# Patient Record
Sex: FEMALE | Race: WHITE | Employment: FULL TIME | URBAN - METROPOLITAN AREA
[De-identification: names, ages, dates, MRNs, and addresses within clinical notes are randomized per-mention and may not be internally consistent; named-entity substitution may affect disease eponyms.]

---

## 2017-03-07 PROCEDURE — 87624 HPV HI-RISK TYP POOLED RSLT: CPT | Performed by: OBSTETRICS & GYNECOLOGY

## 2017-03-07 PROCEDURE — 87491 CHLMYD TRACH DNA AMP PROBE: CPT | Performed by: OBSTETRICS & GYNECOLOGY

## 2017-03-07 PROCEDURE — 87480 CANDIDA DNA DIR PROBE: CPT | Performed by: OBSTETRICS & GYNECOLOGY

## 2017-03-07 PROCEDURE — 87660 TRICHOMONAS VAGIN DIR PROBE: CPT | Performed by: OBSTETRICS & GYNECOLOGY

## 2017-03-07 PROCEDURE — 87625 HPV TYPES 16 & 18 ONLY: CPT | Performed by: OBSTETRICS & GYNECOLOGY

## 2017-03-07 PROCEDURE — 88175 CYTOPATH C/V AUTO FLUID REDO: CPT | Performed by: OBSTETRICS & GYNECOLOGY

## 2017-03-07 PROCEDURE — 87591 N.GONORRHOEAE DNA AMP PROB: CPT | Performed by: OBSTETRICS & GYNECOLOGY

## 2017-03-07 PROCEDURE — 87510 GARDNER VAG DNA DIR PROBE: CPT | Performed by: OBSTETRICS & GYNECOLOGY

## 2017-03-23 PROCEDURE — 88305 TISSUE EXAM BY PATHOLOGIST: CPT | Performed by: OBSTETRICS & GYNECOLOGY

## 2018-05-16 PROCEDURE — 87660 TRICHOMONAS VAGIN DIR PROBE: CPT | Performed by: FAMILY MEDICINE

## 2018-05-16 PROCEDURE — 88175 CYTOPATH C/V AUTO FLUID REDO: CPT | Performed by: FAMILY MEDICINE

## 2018-05-16 PROCEDURE — 87510 GARDNER VAG DNA DIR PROBE: CPT | Performed by: FAMILY MEDICINE

## 2018-05-16 PROCEDURE — 87624 HPV HI-RISK TYP POOLED RSLT: CPT | Performed by: FAMILY MEDICINE

## 2018-05-16 PROCEDURE — 87480 CANDIDA DNA DIR PROBE: CPT | Performed by: FAMILY MEDICINE

## 2018-05-16 PROCEDURE — 87625 HPV TYPES 16 & 18 ONLY: CPT | Performed by: FAMILY MEDICINE

## 2018-06-17 ENCOUNTER — HOSPITAL ENCOUNTER (OUTPATIENT)
Age: 38
Discharge: HOME | End: 2018-06-17
Attending: FAMILY MEDICINE
Payer: COMMERCIAL

## 2018-06-17 VITALS
SYSTOLIC BLOOD PRESSURE: 124 MMHG | RESPIRATION RATE: 16 BRPM | HEIGHT: 64 IN | WEIGHT: 160 LBS | OXYGEN SATURATION: 98 % | BODY MASS INDEX: 27.31 KG/M2 | HEART RATE: 90 BPM | DIASTOLIC BLOOD PRESSURE: 80 MMHG | TEMPERATURE: 98 F

## 2018-06-17 DIAGNOSIS — B02.9 HERPES ZOSTER WITHOUT COMPLICATION: Primary | ICD-10-CM

## 2018-06-17 PROCEDURE — 99203 OFFICE O/P NEW LOW 30 MIN: CPT

## 2018-06-17 PROCEDURE — 99204 OFFICE O/P NEW MOD 45 MIN: CPT

## 2018-06-17 RX ORDER — ACYCLOVIR 800 MG/1
800 TABLET ORAL 4 TIMES DAILY
Qty: 28 TABLET | Refills: 0 | Status: SHIPPED | OUTPATIENT
Start: 2018-06-17 | End: 2018-06-24

## 2018-06-17 NOTE — ED PROVIDER NOTES
Patient Seen in: 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 Cardiovascular: Normal rate, regular rhythm, normal heart sounds and intact distal pulses. Pulmonary/Chest: Effort normal and breath sounds normal.   Abdominal: Soft.  Bowel sounds are normal.   Neurologica

## 2018-06-17 NOTE — ED INITIAL ASSESSMENT (HPI)
The patient is here with complaints of a rash to the right shoulder blade that has been very itchy, burning, and painful. She states it has been present for about 1 week.   At first she thought it was a bug bit and has used hydrocortizone cream but not tod

## 2018-06-18 PROCEDURE — 88342 IMHCHEM/IMCYTCHM 1ST ANTB: CPT | Performed by: OBSTETRICS & GYNECOLOGY

## 2018-06-18 PROCEDURE — 88305 TISSUE EXAM BY PATHOLOGIST: CPT | Performed by: OBSTETRICS & GYNECOLOGY

## 2019-07-11 PROCEDURE — 87625 HPV TYPES 16 & 18 ONLY: CPT | Performed by: OBSTETRICS & GYNECOLOGY

## 2019-07-11 PROCEDURE — 88175 CYTOPATH C/V AUTO FLUID REDO: CPT | Performed by: OBSTETRICS & GYNECOLOGY

## 2019-07-11 PROCEDURE — 87624 HPV HI-RISK TYP POOLED RSLT: CPT | Performed by: OBSTETRICS & GYNECOLOGY

## 2019-09-30 PROCEDURE — 88305 TISSUE EXAM BY PATHOLOGIST: CPT | Performed by: OBSTETRICS & GYNECOLOGY

## 2019-09-30 PROCEDURE — 88342 IMHCHEM/IMCYTCHM 1ST ANTB: CPT | Performed by: OBSTETRICS & GYNECOLOGY

## 2021-02-12 PROBLEM — F41.1 GENERALIZED ANXIETY DISORDER: Status: ACTIVE | Noted: 2021-02-12

## 2021-02-12 PROBLEM — F39 EPISODIC MOOD DISORDER: Status: ACTIVE | Noted: 2021-02-12

## 2021-02-12 PROBLEM — F39 EPISODIC MOOD DISORDER (HCC): Status: ACTIVE | Noted: 2021-02-12

## 2021-02-12 PROBLEM — F33.1 MODERATE RECURRENT MAJOR DEPRESSION (HCC): Status: ACTIVE | Noted: 2021-02-12

## 2021-04-15 PROBLEM — F90.2 ATTENTION DEFICIT HYPERACTIVITY DISORDER (ADHD), COMBINED TYPE: Status: ACTIVE | Noted: 2021-04-15

## 2021-08-20 PROCEDURE — 88305 TISSUE EXAM BY PATHOLOGIST: CPT | Performed by: INTERNAL MEDICINE

## 2024-02-03 ENCOUNTER — WALK IN (OUTPATIENT)
Dept: URGENT CARE | Age: 44
End: 2024-02-03

## 2024-02-03 VITALS
DIASTOLIC BLOOD PRESSURE: 79 MMHG | OXYGEN SATURATION: 97 % | TEMPERATURE: 96.3 F | HEART RATE: 85 BPM | RESPIRATION RATE: 18 BRPM | SYSTOLIC BLOOD PRESSURE: 147 MMHG

## 2024-02-03 DIAGNOSIS — M43.6 TORTICOLLIS, ACUTE: Primary | ICD-10-CM

## 2024-02-03 RX ORDER — LAMOTRIGINE 150 MG/1
TABLET ORAL
COMMUNITY
Start: 2023-12-14

## 2024-02-03 RX ORDER — ALPRAZOLAM 0.5 MG/1
TABLET ORAL
COMMUNITY
Start: 2023-12-14

## 2024-02-03 RX ORDER — KETOROLAC TROMETHAMINE 10 MG/1
10 TABLET, FILM COATED ORAL EVERY 6 HOURS PRN
Qty: 20 TABLET | Refills: 0 | Status: SHIPPED | OUTPATIENT
Start: 2024-02-03

## 2024-02-03 RX ORDER — TRAZODONE HYDROCHLORIDE 50 MG/1
TABLET ORAL
COMMUNITY
Start: 2023-12-14

## 2024-02-03 RX ORDER — METHYLPREDNISOLONE ACETATE 80 MG/ML
80 INJECTION, SUSPENSION INTRA-ARTICULAR; INTRALESIONAL; INTRAMUSCULAR; SOFT TISSUE ONCE
Status: COMPLETED | OUTPATIENT
Start: 2024-02-03 | End: 2024-02-03

## 2024-02-03 RX ORDER — BUPROPION HYDROCHLORIDE 300 MG/1
TABLET ORAL
COMMUNITY
Start: 2023-12-14

## 2024-02-03 RX ORDER — KETOROLAC TROMETHAMINE 30 MG/ML
15 INJECTION, SOLUTION INTRAMUSCULAR; INTRAVENOUS ONCE
Status: COMPLETED | OUTPATIENT
Start: 2024-02-03 | End: 2024-02-03

## 2024-02-03 RX ORDER — LISDEXAMFETAMINE DIMESYLATE CAPSULES 70 MG/1
CAPSULE ORAL
COMMUNITY
Start: 2023-12-14

## 2024-02-03 RX ORDER — PREDNISONE 20 MG/1
40 TABLET ORAL DAILY
Qty: 10 TABLET | Refills: 0 | Status: SHIPPED | OUTPATIENT
Start: 2024-02-03 | End: 2024-02-08

## 2024-02-03 RX ORDER — SUMATRIPTAN 6 MG/.5ML
INJECTION, SOLUTION SUBCUTANEOUS
COMMUNITY

## 2024-02-03 RX ADMIN — METHYLPREDNISOLONE ACETATE 80 MG: 80 INJECTION, SUSPENSION INTRA-ARTICULAR; INTRALESIONAL; INTRAMUSCULAR; SOFT TISSUE at 08:17

## 2024-02-03 RX ADMIN — KETOROLAC TROMETHAMINE 15 MG: 30 INJECTION, SOLUTION INTRAMUSCULAR; INTRAVENOUS at 08:20

## 2024-02-03 ASSESSMENT — PAIN SCALES - GENERAL
PAINLEVEL: 6
PAINLEVEL: 8

## 2025-02-17 ENCOUNTER — TELEPHONE (OUTPATIENT)
Dept: ORTHOPEDICS CLINIC | Facility: CLINIC | Age: 45
End: 2025-02-17

## 2025-02-17 DIAGNOSIS — M25.562 LEFT KNEE PAIN, UNSPECIFIED CHRONICITY: Primary | ICD-10-CM

## 2025-02-17 NOTE — TELEPHONE ENCOUNTER
Patient called to schedule for the left knee. Please advise if Marielena needs imaging   Future Appointments   Date Time Provider Department Center   2/18/2025  8:00 AM Marielena Sims PA-C [facility] [address]

## 2025-02-18 ENCOUNTER — OFFICE VISIT (OUTPATIENT)
Facility: CLINIC | Age: 45
End: 2025-02-18
Payer: COMMERCIAL

## 2025-02-18 ENCOUNTER — HOSPITAL ENCOUNTER (OUTPATIENT)
Dept: GENERAL RADIOLOGY | Age: 45
Discharge: HOME | End: 2025-02-18
Payer: COMMERCIAL

## 2025-02-18 VITALS — BODY MASS INDEX: 27.83 KG/M2 | HEIGHT: 64.5 IN | WEIGHT: 165 LBS

## 2025-02-18 DIAGNOSIS — M25.462 EFFUSION OF LEFT KNEE: Primary | ICD-10-CM

## 2025-02-18 DIAGNOSIS — M25.562 LEFT KNEE PAIN, UNSPECIFIED CHRONICITY: ICD-10-CM

## 2025-02-18 PROCEDURE — 73564 X-RAY EXAM KNEE 4 OR MORE: CPT

## 2025-02-18 PROCEDURE — 20610 DRAIN/INJ JOINT/BURSA W/O US: CPT

## 2025-02-18 PROCEDURE — 99204 OFFICE O/P NEW MOD 45 MIN: CPT

## 2025-02-18 RX ORDER — KETOROLAC TROMETHAMINE 10 MG/1
1 TABLET, FILM COATED ORAL EVERY 6 HOURS PRN
COMMUNITY
Start: 2024-02-03

## 2025-02-18 RX ORDER — TRIAMCINOLONE ACETONIDE 40 MG/ML
40 INJECTION, SUSPENSION INTRA-ARTICULAR; INTRAMUSCULAR ONCE
Status: COMPLETED | OUTPATIENT
Start: 2025-02-18 | End: 2025-02-18

## 2025-02-18 RX ADMIN — TRIAMCINOLONE ACETONIDE 40 MG: 40 INJECTION, SUSPENSION INTRA-ARTICULAR; INTRAMUSCULAR at 15:53:00

## 2025-02-18 NOTE — PROCEDURES
After informed consent, the patient's left knee was marked and prepped with antiseptic solution.  Local anesthetic was used to create a skin wheal near the superolateral aspiration site.  It was reprepped with antiseptic solution, and an 18-gauge needle was inserted through the superolateral portal site, into the knee joint deep to the patella.  Aspiration was performed and returned 35cc of serosanguineous fluid.   Following this, the patient's left knee was was injected with a mixture of 1mL 40mg/mL Kenalog, 2mL 1% lidocaine and 2mL 0.5% marcaine through the superolateral portal.  A band-aid was applied.  The patient tolerated the procedure well.   A band-aid was applied.  The patient tolerated the procedure well.    Marielena Sims PA-C  [facility]

## 2025-02-18 NOTE — PROGRESS NOTES
[facility] New Patient Note    CC: left knee swelling   Chief Complaint   Patient presents with    Knee Pain     Left Knee Pain  Onset: 1 month,  swollen  Pain Score: 8-9, pain upon exertion         HPI: This 44 year old female presents today with complaints of left knee swelling.  She reports onset of left knee pain and swelling started approximately 1-1/2 months ago and have been progressively worsening over the past week.  No known injury or trauma to the left knee or lower extremities.  States one morning she woke up and noticed that her left knee was very swollen.  This has continued for the past month and a half and does not seem to be getting any better.  The last time this occurred was in adolescence for which she had her knee aspirated a few times which seem to provide relief. Associated with the swelling, she reports a deep, achy, throbbing and constant pain and stiffness that is generalized to the left knee.  She has discomfort with her activities of daily living including walking for prolonged periods of time, standing for prolonged periods of time, going up and down stairs, sitting at rest, and sleeping at night.  Depending on how she walks, the knee does feel unstable at times.  She denies any numbness or tingling that travels down the leg or into the toes.  She has been taking over-the-counter anti-inflammatory medication and icing which has not provided much relief.  She is here today for further evaluation.    Past Medical History:    Celiac disease (HCC)    Headache     Past Surgical History:   Procedure Laterality Date    Colonoscopy            twins - 1 birth    Other surgical history      wisdom teeth     Current Outpatient Medications   Medication Sig Dispense Refill    Ketorolac Tromethamine 10 MG Oral Tab Take 1 tablet (10 mg total) by mouth every 6 (six) hours as needed.      SUMAtriptan 6 MG/0.5ML Subcutaneous Solution Inject 0.5 mL (6 mg total) into the skin as needed (migraine).  Max 2 injections in 24 hours 3 each 2    amphetamine-dextroamphetamine 20 MG Oral Tab TAKE ONE TABLET BY MOUTH (20 MG TOTAL) TWO TIMES DAILY 60 tablet 0    traZODone 50 MG Oral Tab Take one or two tabs at bedtime as needed, for insomnia 60 tablet 1    ALPRAZolam 0.5 MG Oral Tab TAKE 1/2 OR ONE TABLET BY MOUTH ONCE DAILY (OR LESS FREQUENT) AS NEEDED FOR ANXIETY 30 tablet 1    buPROPion 150 MG Oral Tablet 24 Hr Take 1 tablet (150 mg total) by mouth every morning. 30 tablet 3    lamoTRIgine 100 MG Oral Tab Take 1 tablet (100 mg total) by mouth daily. 30 tablet 3    levonorgestrel 19.5 MG Intrauterine IUD 19,500 mcg (1 each total) by Intrauterine route one time.      metroNIDAZOLE (METROGEL-VAGINAL) 0.75 % Vaginal Gel Place 1 applicator vaginally once a week. For 4-6 months (Patient not taking: Reported on 2/18/2025) 70 g 1     Allergies[1]  Family History   Problem Relation Age of Onset    Psychiatric Mother         anorexia    Other (Other) Maternal Grandmother         parkinsons    Heart Disorder Maternal Grandfather     Heart Disorder Paternal Grandfather     Diabetes Paternal Grandfather     Hypertension Paternal Grandfather     Cancer Paternal Aunt 50        colon    Breast Cancer Maternal Aunt 40        dx age 40     Social History     Occupational History    Not on file   Tobacco Use    Smoking status: Former     Current packs/day: 0.00     Types: Cigarettes     Quit date: 5/1/2009     Years since quitting: 15.8    Smokeless tobacco: Never   Vaping Use    Vaping status: Never Used   Substance and Sexual Activity    Alcohol use: Yes     Alcohol/week: 0.0 standard drinks of alcohol     Types: 2 - 3 Glasses of wine per week     Comment: social    Drug use: No    Sexual activity: Not on file        ROS:  Comprehensive system review obtained and negative except as mentioned above    Physical Exam:    Ht 5' 4.5\" (1.638 m)   Wt 165 lb (74.8 kg)   BMI 27.88 kg/m²   Constitutional: Awake, alert, no distress. Very  pleasant and conversational   Psychological: Appropriate affect.  Respiratory: Unlabored breathing.  Left lower extremity:  Inspection: skin is intact without any redness or deformity.  Moderate effusion.   Palpation: No warmth to palpation. Mild tenderness to palpation about the lateral joint line. No tenderness to palpation over the medial joint line.  No tenderness palpation over the superior or inferior pole the patella.  No tenderness palpation over the distal quad tendon.  No appreciable quad atrophy.  Range of motion: Knee can extend to 10 degrees short of full and flex to approximately 100 degrees.  Mild pain with active knee extension.  No pain with active flexion  Knee is stable to valgus and varus stress at 0 and 30 degrees. No laxity with anterior or posterior drawer.  Positive Katelyn test.  No calf pain or tenderness.  Negative Homans' sign.  Neuromuscular: Strength is normal and sensation is intact.  Vascular: Extremities are warm and well-perfused.  Lymph: Unremarkable.    Imaging: Imaging was personally viewed, independently interpreted and radiology report read. They show:  XR KNEE, COMPLETE (4 OR MORE VIEWS), LEFT (CPT=73564)    Result Date: 2/18/2025  CONCLUSION:  No evidence of acute displaced fracture or dislocation in the left knee.  Medium-sized joint effusion.  LOCATION:  [facility]   Dictated by (CST): Jose Manuel Kwok MD on 2/18/2025 at 8:19 AM     Finalized by (CST): Jose Manuel Kwok MD on 2/18/2025 at 8:19 AM         Assessment/Plan:  Assessment: 44-year-old female with symptomatic joint effusion of the left knee    Plan: I reviewed x-ray and physical exam findings with the patient which seems to be consistent with a symptomatic joint effusion of the left knee.  Radiographs of the left knee did not show any signs of acute fracture or dislocation, however there is a moderate joint effusion noted on x-ray and physical exam as well as positive Katelyn test.  Joint spaces are very well preserved  and there does not appear to be any significant signs of degenerative joint disease. Discussed differential diagnosis include meniscus tear, other internal derangement, inflammatory or rheumatoid arthritis, pseudogout vs gout, other autoimmune disease, flare up from weather changes, diet etc. Due to the moderate joint effusion and significant pain/impact this is having on her activities of daily living, it would be reasonable to perform an aspiration of the knee and diagnostic vs therapeutic corticosteroid injection.  Please see separate procedure note for additional details. MRI order was placed at today's visit to evaluate for structural cause or further internal derangement of the left knee. Patient will call to schedule and follow up with me in person after MRI to review results and discuss next steps in treatment.  She has an appointment scheduled in April with a rheumatologist as well.  All questions and concerns were addressed and answered to the patient satisfaction.  She is in agreement with treatment plan going forward.    DARCIE Cárdenas, PA-C  Orthopedic Surgery   t: [phone]  f: [phone]           This document was partially prepared using Dragon Medical voice recognition software.  Although every attempt is made to correct errors during dictation, discrepancies may still exist. Please contact me with any questions or clarifications.         [1]   Allergies  Allergen Reactions    Penicillins HIVES and SWELLING     Joint swelling    Gluten Flour

## 2025-03-26 ENCOUNTER — HOSPITAL ENCOUNTER (OUTPATIENT)
Dept: MRI IMAGING | Facility: HOSPITAL | Age: 45
Discharge: HOME | End: 2025-03-26
Payer: COMMERCIAL

## 2025-03-26 DIAGNOSIS — M25.462 EFFUSION OF LEFT KNEE: ICD-10-CM

## 2025-03-26 PROCEDURE — 73721 MRI JNT OF LWR EXTRE W/O DYE: CPT

## 2025-03-27 ENCOUNTER — TELEPHONE (OUTPATIENT)
Facility: CLINIC | Age: 45
End: 2025-03-27

## 2025-03-27 NOTE — TELEPHONE ENCOUNTER
Per Result Note:      Left voice mail for pt to call back and schedule MRI follow up with MAYE Cárdenas

## 2025-04-03 ENCOUNTER — TELEPHONE (OUTPATIENT)
Facility: CLINIC | Age: 45
End: 2025-04-03

## 2025-04-03 DIAGNOSIS — M25.561 PAIN IN BOTH KNEES, UNSPECIFIED CHRONICITY: Primary | ICD-10-CM

## 2025-04-03 DIAGNOSIS — M25.562 PAIN IN BOTH KNEES, UNSPECIFIED CHRONICITY: Primary | ICD-10-CM

## 2025-04-04 ENCOUNTER — OFFICE VISIT (OUTPATIENT)
Dept: ORTHOPEDICS CLINIC | Facility: CLINIC | Age: 45
End: 2025-04-04
Payer: COMMERCIAL

## 2025-04-04 VITALS — HEIGHT: 64.5 IN | WEIGHT: 172 LBS | BODY MASS INDEX: 29.01 KG/M2

## 2025-04-04 DIAGNOSIS — M25.562 LEFT KNEE PAIN, UNSPECIFIED CHRONICITY: ICD-10-CM

## 2025-04-04 DIAGNOSIS — M25.462 EFFUSION OF LEFT KNEE: Primary | ICD-10-CM

## 2025-04-04 PROCEDURE — 99213 OFFICE O/P EST LOW 20 MIN: CPT

## 2025-04-04 NOTE — PROGRESS NOTES
[facility] Progress Note    {Vanishing suggested script  \"DrAlvino [X] would like to utilize a tool that securely records the visit conversation to help write his/her medical notes so he/she can pay closer attention to you and less time on the computer\".:[id number]}     The following individual(s) verbally consented to be recorded using ambient AI listening technology and understand that they can each withdraw their consent to this listening technology at any point by asking the clinician to turn off or pause the recording:    Patient name: Clotilde Pritchard        Chief Complaint:  MRI follow up left knee    History of Present Illness        Physical Exam      Results  Imaging was personally viewed, independently interpreted and radiology report read.  They show:   MRI KNEE, LEFT (XMW=[id number])    Result Date: 3/26/2025  CONCLUSION:  Large joint effusion with synovitis throughout the knee.  No discrete evidence of internal derangement of the knee is otherwise noted.  Given the significant effusion and synovitis with lack of other findings, the appearance would suggest an inflammatory/rheumatologic process.   LOCATION:  [facility]          Dictated by (CST): Gino Schmidt DO on 3/26/2025 at 9:51 AM     Finalized by (CST): Gino Schmidt DO on 3/26/2025 at 9:55 AM         Assessment & Plan  Assessment:  44 year old female with ***      Plan:  ***      DARCIE Cárdenas, PA-C  Orthopedic Surgery   [address] [place] [id number]   t: [phone]  f: [phone]         This document was partially prepared using Dragon Medical voice recognition software.  Although every attempt is made to correct errors during dictation, discrepancies may still exist. Please contact me with any questions or clarifications.

## 2025-04-04 NOTE — PROGRESS NOTES
[facility] Progress Note      Chief Complaint: Follow-up left knee MRI      Subjective: 44 year old female presents today to follow-up on left knee MRI.  Over the past 2 months, she reports the left knee pain and swelling have slightly improved. She continues to experience intermittent left knee swelling, but it seems to be less than before. She cannot recall any specific incidents or changes in routine that seem to trigger the flare up. She describes it more as a discomfort rather than pain. This affects her activities of daily living including walking for long periods of time, standing for prolonged periods of time, going up and down stairs, sitting with the knees bent at rest, and occasionally sleeping at night.  She denies any new feelings of weakness or instability.  She denies any numbness or tingling that travels down the leg or into the toes.  She is here today for follow-up.      Objective: Very pleasant 44-year-old female who does not appear in any acute distress.  Awake, alert, and oriented to person, place, and time.  Unlabored breathing.  Appropriate affect.  Exam of the left knee and lower extremity reveals overlying skin is intact.  There is a moderate joint effusion present.  No erythema or ecchymosis.  Knee can extend to 10 degrees short of full and flex to approximately 100 degrees.  Mild pain with active knee extension.  No pain with active flexion.  No calf pain or tenderness.  Negative Homans' sign.  Sensation is present to light touch.  She ambulates with a non-antalgic gait.      Imaging: Imaging was personally viewed, independently interpreted, and radiology report read.  They show:  MRI KNEE, LEFT (ITT=[id number])    Result Date: 3/26/2025  CONCLUSION:  Large joint effusion with synovitis throughout the knee.  No discrete evidence of internal derangement of the knee is otherwise noted.  Given the significant effusion and synovitis with lack of other findings, the appearance would suggest an  inflammatory/rheumatologic process.   LOCATION:  [place]          Dictated by (CST): Gino Schmidt DO on 3/26/2025 at 9:51 AM     Finalized by (CST): Gino Schmidt DO on 3/26/2025 at 9:55 AM           Assessment:  44 year old female with effusion of the left knee and chronic left knee pain      Plan: Overall, the patient is mildly improved since her last office visit and has noticed some decrease in the amount and occurrence of the swelling in her left knee, although still present.  Reviewed results of the left knee MRI in great detail with the patient explained that there appears to be a large joint effusion with synovitis throughout the knee.  However there does not appear to be any concrete evidence of internal derangement to the knee. Explained the medial and lateral meniscus, ACL, PCL, MCL, and LCL all appear intact.  In agreement with the radiologist, given the significant effusion and synovitis with lack of other findings, the appearance would suggest an inflammatory or rheumatological process.  The patient states this is not a surprise and kind of what she expected.  She has a follow-up appointment next week with a rheumatologist and will discuss with them further for best next steps in treatment.  She will follow-up with me on an as-needed basis or sooner with any worsening symptoms or concerns.  All questions and concerns were addressed and answered to the patient satisfaction.  She is agreement with treatment plan going forward.      Marielena Sims [facility], PA-C  Orthopedic Surgery   [address] [place] [id number]   t: [phone]  f: [phone]         This document was partially prepared using Dragon Medical voice recognition software.  Although every attempt is made to correct errors during dictation, discrepancies may still exist. Please contact me with any questions or clarifications.

## 2025-05-12 ENCOUNTER — TELEPHONE (OUTPATIENT)
Dept: ORTHOPEDICS CLINIC | Facility: CLINIC | Age: 45
End: 2025-05-12

## 2025-05-12 DIAGNOSIS — M25.561 RIGHT KNEE PAIN, UNSPECIFIED CHRONICITY: Primary | ICD-10-CM

## 2025-05-16 ENCOUNTER — HOSPITAL ENCOUNTER (OUTPATIENT)
Dept: GENERAL RADIOLOGY | Age: 45
Discharge: HOME | End: 2025-05-16
Payer: COMMERCIAL

## 2025-05-16 ENCOUNTER — OFFICE VISIT (OUTPATIENT)
Dept: ORTHOPEDICS CLINIC | Facility: CLINIC | Age: 45
End: 2025-05-16
Payer: COMMERCIAL

## 2025-05-16 VITALS — HEIGHT: 64 IN | BODY MASS INDEX: 29.02 KG/M2 | WEIGHT: 170 LBS

## 2025-05-16 DIAGNOSIS — M25.461 EFFUSION OF RIGHT KNEE: Primary | ICD-10-CM

## 2025-05-16 DIAGNOSIS — M25.561 ACUTE PAIN OF RIGHT KNEE: ICD-10-CM

## 2025-05-16 DIAGNOSIS — M25.561 RIGHT KNEE PAIN, UNSPECIFIED CHRONICITY: ICD-10-CM

## 2025-05-16 PROCEDURE — 99214 OFFICE O/P EST MOD 30 MIN: CPT

## 2025-05-16 PROCEDURE — 73564 X-RAY EXAM KNEE 4 OR MORE: CPT

## 2025-05-16 NOTE — PROGRESS NOTES
The following individual(s) verbally consented to be recorded using ambient AI listening technology and understand that they can each withdraw their consent to this listening technology at any point by asking the clinician to turn off or pause the recording:    Patient name: Clotilde Pritchard  Additional names:

## 2025-05-16 NOTE — PROGRESS NOTES
[facility] New Patient Note         The following individual(s) verbally consented to be recorded using ambient AI listening technology and understand that they can each withdraw their consent to this listening technology at any point by asking the clinician to turn off or pause the recording:    Patient name: Clotilde Pritchard    Chief Complaint   Patient presents with    Knee Pain     Right knee pain starting about 1  month ago   Previously seen for her left knee   Patient notes a sharp pain ad instability . Notes discomfort with twisting certain ways . Notes the first three weeks have been worse notes a little better . States swelling is at the top of knee cap up        History of Present Illness  Allegra Pritchard is a 44 year old female who presents with right knee pain and instability.    About a month ago, she experienced a sharp pain in her right knee while kneeling to clean a dryer vent, accompanied by a 'pop' sound and immediate pain. Since then, the knee feels unstable when walking, and she avoids turning or bending it due to discomfort. The pain is sharp, with significant swelling, particularly after increased activity. She experiences tightness and stiffness sensation when trying to bend the knee. No pain at rest or during sleep. Denies any numbness or tingling sensation in the lower extremity.    The swelling is persistent, primarily located above the knee and extending into the thigh, without radiation down the leg or to the back. There is no catching or locking sensation, but the knee feels 'shaky' and 'floating'. She avoids stairs due to instability and uses a wedge under her knee and a footrest at her desk for comfort while resting.    She has a history of issues with her left knee but reports that prior to this incident, her right knee was asymptomatic. She has seen a rheumatologist and had an MRI of her neck due to soreness, and she is awaiting further evaluation  of her autoimmune and rheumatological conditions.  As of now, her rheumatologist believes that chronic left knee swelling and other symptoms she has been experiencing is consistent with psoriatic arthritis.     She has been taking ibuprofen and Tylenol for pain management which is providing some short-term relief.  She has been icing occasionally.  She is here today for further evaluation.       Past Medical History[1]  Past Surgical History[2]  Current Medications[3]  Allergies[4]  Family History[5]  Social History     Occupational History    Not on file   Tobacco Use    Smoking status: Former     Current packs/day: 0.00     Types: Cigarettes     Quit date: 2009     Years since quittin.0    Smokeless tobacco: Never    Tobacco comments:     Updated 25   Vaping Use    Vaping status: Never Used   Substance and Sexual Activity    Alcohol use: Yes     Alcohol/week: 0.0 standard drinks of alcohol     Types: 2 - 3 Glasses of wine per week     Comment: social    Drug use: No    Sexual activity: Not on file        ROS:  Comprehensive system review obtained and negative except as mentioned above    Physical Exam:      Physical Exam      Ht 5' 4\" (1.626 m)   Wt 170 lb (77.1 kg)   BMI 29.18 kg/m²   Constitutional: Awake, alert, no distress.  Very pleasant and conversational  Psychological: Appropriate affect.  Respiratory: Unlabored breathing.  Right lower extremity:  Inspection: skin is intact without any redness or deformity.  Moderate effusion.   Palpation: Mild tenderness to palpation over the lateral joint line.  No tenderness to palpation over the medial joint line.  No tenderness to palpation over the superior or inferior pole of the patella.  No tenderness to palpation over the distal quad tendon.  No appreciable quad atrophy.  Range of motion: Knee can extend to 0 and flex to approximately 120 degrees.  She has some stiffness with active knee flexion.  No pain with active extension or flexion.   Positive Marilee test.  No calf pain or tenderness.  Negative Homans' sign.  Neuromuscular: Strength is normal and sensation is intact.  Vascular: Extremities are warm and well-perfused.  Lymph: Unremarkable.    Imaging: Imaging was personally viewed, independently interpreted and radiology report read. They show:  XR KNEE, COMPLETE (4 OR MORE VIEWS), RIGHT (CPT=73564)  Result Date: 5/16/2025  CONCLUSION:   No fracture dislocation.  No significant arthropathy.  No sign of significant joint narrowing osteophyte endplate sclerosis or other features typical for arthropathy.  Minimal joint fluid, but no large joint effusion.  Mild anterior knee swelling.  LOCATION:  [facility]   Dictated by (CST): Bam Phillips MD on 5/16/2025 at 8:25 AM     Finalized by (SANDRA): Bam Phillips MD on 5/16/2025 at 8:25 AM          Results        Assessment & Plan  Assessment: 44-year-old female with acute right knee pain and right knee effusion    Plan: I reviewed x-ray and physical exam findings with the patient in great detail which is consistent with acute right knee effusion of unspecified significance.  I explained that radiographs today do not show any signs of an acute fracture or dislocation and joint spaces are very well-preserved without any significant signs of degenerative joint disease.  There does appear to be a suprapatellar joint effusion on imaging and physical exam today.  Given the patient's mechanism of injury, knee effusion, and positive Marilee test I would like to order an MRI to check for further internal derangement to the right knee which she is in agreement with.  An MRI order was placed at today's visit and she will call to schedule soon.  In the meantime, advised her to continue taking ibuprofen on an as needed basis to help with the pain and inflammation.  Continue icing and elevating the knee several times a day.  Encouraged her to continue gentle range of motion exercises about the knee to reduce stiffness.   She will follow-up with me after the MRI is completed in the office to discuss the results and best next steps in treatment.  She will follow-up sooner with any worsening symptoms or concerns.  All questions and concerns were addressed and answered to the patient's satisfaction.  She is in agreement with treatment plan going forward.      DARICE Cárdenas, MERLYN  Orthopedic Surgery   t: [phone]  f: [phone]           This document was partially prepared using Dragon Medical voice recognition software.  Although every attempt is made to correct errors during dictation, discrepancies may still exist. Please contact me with any questions or clarifications.         [1]   Past Medical History:   Celiac disease (HCC)    Headache   [2]   Past Surgical History:  Procedure Laterality Date    Colonoscopy            twins - 1 birth    Other surgical history      wisdom teeth   [3]   Current Outpatient Medications   Medication Sig Dispense Refill    Ketorolac Tromethamine 10 MG Oral Tab Take 1 tablet (10 mg total) by mouth every 6 (six) hours as needed.      SUMAtriptan 6 MG/0.5ML Subcutaneous Solution Inject 0.5 mL (6 mg total) into the skin as needed (migraine). Max 2 injections in 24 hours 3 each 2    amphetamine-dextroamphetamine 20 MG Oral Tab TAKE ONE TABLET BY MOUTH (20 MG TOTAL) TWO TIMES DAILY 60 tablet 0    traZODone 50 MG Oral Tab Take one or two tabs at bedtime as needed, for insomnia 60 tablet 1    metroNIDAZOLE (METROGEL-VAGINAL) 0.75 % Vaginal Gel Place 1 applicator vaginally once a week. For 4-6 months 70 g 1    ALPRAZolam 0.5 MG Oral Tab TAKE 1/2 OR ONE TABLET BY MOUTH ONCE DAILY (OR LESS FREQUENT) AS NEEDED FOR ANXIETY 30 tablet 1    buPROPion 150 MG Oral Tablet 24 Hr Take 1 tablet (150 mg total) by mouth every morning. 30 tablet 3    lamoTRIgine 100 MG Oral Tab Take 1 tablet (100 mg total) by mouth daily. 30 tablet 3    levonorgestrel 19.5 MG Intrauterine IUD 19,500 mcg (1 each total) by  Intrauterine route one time.     [4]   Allergies  Allergen Reactions    Penicillins HIVES and SWELLING     Joint swelling    Gluten Flour    [5]   Family History  Problem Relation Age of Onset    Psychiatric Mother         anorexia    Other (Other) Maternal Grandmother         parkinsons    Heart Disorder Maternal Grandfather     Heart Disorder Paternal Grandfather     Diabetes Paternal Grandfather     Hypertension Paternal Grandfather     Cancer Paternal Aunt 50        colon    Breast Cancer Maternal Aunt 40        dx age 40

## 2025-06-12 ENCOUNTER — HOSPITAL ENCOUNTER (OUTPATIENT)
Dept: MRI IMAGING | Age: 45
Discharge: HOME | End: 2025-06-12
Payer: COMMERCIAL

## 2025-06-12 DIAGNOSIS — M25.561 ACUTE PAIN OF RIGHT KNEE: ICD-10-CM

## 2025-06-12 DIAGNOSIS — M25.461 EFFUSION OF RIGHT KNEE: ICD-10-CM

## 2025-06-12 PROCEDURE — 73721 MRI JNT OF LWR EXTRE W/O DYE: CPT

## 2025-06-20 ENCOUNTER — OFFICE VISIT (OUTPATIENT)
Dept: ORTHOPEDICS CLINIC | Facility: CLINIC | Age: 45
End: 2025-06-20
Payer: COMMERCIAL

## 2025-06-20 VITALS — WEIGHT: 180 LBS | HEIGHT: 64 IN | BODY MASS INDEX: 30.73 KG/M2

## 2025-06-20 DIAGNOSIS — M22.2X1 PATELLOFEMORAL SYNDROME OF RIGHT KNEE: Primary | ICD-10-CM

## 2025-06-20 DIAGNOSIS — M76.891 POPLITEUS TENDINITIS OF RIGHT LOWER EXTREMITY: ICD-10-CM

## 2025-06-20 DIAGNOSIS — M25.461 EFFUSION OF RIGHT KNEE: ICD-10-CM

## 2025-06-20 RX ORDER — TRIAMCINOLONE ACETONIDE 40 MG/ML
40 INJECTION, SUSPENSION INTRA-ARTICULAR; INTRAMUSCULAR ONCE
Status: COMPLETED | OUTPATIENT
Start: 2025-06-20 | End: 2025-06-20

## 2025-06-20 RX ADMIN — TRIAMCINOLONE ACETONIDE 40 MG: 40 INJECTION, SUSPENSION INTRA-ARTICULAR; INTRAMUSCULAR at 10:35:00

## 2025-06-20 NOTE — PROGRESS NOTES
[facility] Progress Note         The following individual(s) verbally consented to be recorded using ambient AI listening technology and understand that they can each withdraw their consent to this listening technology at any point by asking the clinician to turn off or pause the recording:    Patient name: Clotilde Pritchard    History of Present Illness  Allegra Pritchard is a 44 year old female with psoriatic arthritis who presents with persistent right knee pain and instability.    She has been experiencing persistent right knee pain and instability for the past month, with no significant improvement. The knee remains swollen and produces a 'clicking noise' along with a sensation of something 'rubbing' inside. Instability is particularly noted when walking downstairs, and she fears her kneecap might 'slip out'. Some days are better than others, but overall, there has been no significant change in her symptoms.    Pain is primarily experienced during activities such as walking and using stairs. She uses a stool at work to elevate her leg as much as possible. Bending and kneeling are particularly difficult, and she avoids these activities unless necessary. Sleeping with her knee elevated provides some relief by morning, although it is uncomfortable.    She has not received a steroid injection in her right knee, only in the left. She has a history of psoriatic arthritis, which contributes to her symptoms. Her kneecap does not feel the same as her other knee, and she experiences pain and instability. She recently saw her rheumatologist and is scheduled to start Enbrel for psoriatic arthritis starting tomorrow.    She has been in pain lately and is here today for further evaluation and to follow-up on MRI results of the right knee.      Physical Exam  Constitutional: Awake, alert, no distress.  Very pleasant and conversational  Psychological: Appropriate affect.  Respiratory: Unlabored  breathing.  Right lower extremity:  Inspection: skin is intact without any redness or deformity.  Moderate effusion.   Palpation: Tenderness to palpation over the lateral joint line and LCL.  No tenderness palpation over the medial joint line or MCL.  Tenderness palpation over the superior and inferior pole of the patella.  Tenderness to palpation about the posterior knee and popliteus tendon.  No tenderness palpation over the distal quad tendon.  No appreciable quad atrophy.  Range of motion: Knee can extend to 0 and flex to approximately 110 degrees.  She has pain and stiffness with active knee flexion.  No pain with active knee extension.  Positive Marilee test.  No calf pain or tenderness.  Negative Homans' sign.  Neuromuscular: Strength is normal and sensation is intact.  Vascular: Extremities are warm and well-perfused.  Lymph: Unremarkable.          Results  Imaging: I personally viewed, independently interpreted, and radiology report read.  They show:  MRI KNEE, RIGHT ([id number]=[id number])  Result Date: 6/12/2025  CONCLUSION:  1. Normal menisci. 2. Normal cruciate ligaments.  Sprain of the MCL and lateral patellar retinaculum. 3. Strain and suspected partial tear of the popliteus near the myotendinous junction.  Strain of the proximal soleus muscle belly. 4. Moderate/large knee joint effusion with evidence of synovitis.  Additionally noted moderate edema of Hoffa's fat pad which may represent posttraumatic contusion or sequela of patellar maltracking.   LOCATION:  [facility]          Dictated by (CST): Marquise Marie MD on 6/12/2025 at 9:38 AM     Finalized by (CST): Cruz Sparrow MD on 6/12/2025 at 1:24 PM            Assessment & Plan  Assessment:  44 year old female with a history of psoriatic arthritis with right knee effusion, patellofemoral syndrome, and partial tear of the popliteus of the right knee      Plan: I reviewed patient's history, recent rheumatology appointment, and MRI results in great detail and explained  that I believe the majority of her symptoms and physical exam findings are consistent with a right knee effusion, patellofemoral syndrome, and a partial tear of the popliteus tendon of the right knee.  There also appears to be a large knee joint effusion with evidence of synovitis which correlates to her history of psoriatic and inflammatory arthritis that is currently being managed by rheumatology.  Of note, she is started doing Enbrel tomorrow prescribed by her rheumatologist to help manage her psoriatic arthritis.  In terms of the partial tear of the popliteus tendon causing pain, stiffness, and limited flexion, I explained that this can typically be managed conservatively with a combination of rest, activity modification, formal physical therapy, anti-inflammatory medications, and icing and elevation.    I also discussed the etiology and pathophysiology of patellofemoral dysfunction with the patient in detail. We discussed the biomechanical nature of patellofemoral dysfunction causing inflammation behind the kneecap as a result of maltracking. We discussed principles of treatment including pain control with as-needed use of anti-inflammatory medications and possible intra-articular injections if oral anti-inflammatories fail to improve pain substantially. We also discussed improving biomechanics and patellar tracking through strengthening the quadriceps musculature with physical therapy or physician-directed exercises. We discussed that improving the maltracking may take up to 1 year of diligent exercises.     Given her diagnoses, appreciable effusion on exam today, and significant pain she has been in lately, it would be reasonable to move forward with a corticosteroid injection into the right knee today which she is in agreement with.  Please see separate procedure note for additional details.  I also emphasized the importance of participating in formal physical therapy to work on managing the popliteus as  well as improving patellar maltracking and overall conditioning of the knee.  She is in agreement with this and an external prescription for formal physical therapy was placed at today's visit and she will call to schedule soon.  Supplement home exercise program on the days she is not going to physical therapy.  Emphasize importance of elevating and icing the knee several times a day.  Follow-up with rheumatology per their recommendations.  Follow-up with me in 4 weeks to assess effectiveness of corticosteroid injection and formal physical therapy or sooner with any worsening symptoms or concerns. All questions and concerns were addressed and answered to the patient's satisfaction. They are in agreement with the treatment plan going forward.       DARCIE Cárdenas, PA-C  Orthopedic Surgery   [address] [place] [id number]   t: [phone]  f: [phone]         This document was partially prepared using Dragon Medical voice recognition software.  Although every attempt is made to correct errors during dictation, discrepancies may still exist. Please contact me with any questions or clarifications.

## 2025-06-20 NOTE — PROCEDURES
Risks and benefits of knee injection discussed with the patient, with risks including but not limited to pain and swelling at the injection site and/or within the knee joint, infection, elevation in blood pressure and/or glucose levels, facial flushing. After informed consent, the patient's right knee was marked, locally anesthetized with skin refrigerant, prepped with topical antiseptic, and injected with a mixture of 1mL 40mg/mL Kenalog, 2mL 1% lidocaine and 2mL 0.5% marcaine through the inferolateral portal.  A band-aid was applied.  The patient tolerated the procedure well.      DARCIE Cárdenas, PAOdinC  Orthopedic Surgery   t: [phone]  f: [phone]           This document was partially prepared using Dragon Medical voice recognition software.  Although every attempt is made to correct errors during dictation, discrepancies may still exist. Please contact me with any questions or clarifications.

## 2025-07-18 ENCOUNTER — OFFICE VISIT (OUTPATIENT)
Dept: ORTHOPEDICS CLINIC | Facility: CLINIC | Age: 45
End: 2025-07-18
Payer: COMMERCIAL

## 2025-07-18 VITALS — WEIGHT: 177 LBS | HEIGHT: 64 IN | BODY MASS INDEX: 30.22 KG/M2

## 2025-07-18 DIAGNOSIS — M76.891 POPLITEUS TENDINITIS OF RIGHT LOWER EXTREMITY: ICD-10-CM

## 2025-07-18 DIAGNOSIS — M22.2X1 PATELLOFEMORAL SYNDROME OF RIGHT KNEE: Primary | ICD-10-CM

## 2025-07-18 PROCEDURE — 99214 OFFICE O/P EST MOD 30 MIN: CPT

## 2025-07-18 RX ORDER — SERDEXMETHYLPHENIDATE AND DEXMETHYLPHENIDATE 10.4; 52.3 MG/1; MG/1
CAPSULE ORAL
COMMUNITY
Start: 2025-06-30

## 2025-07-18 NOTE — PROGRESS NOTES
[facility] Progress Note         The following individual(s) verbally consented to be recorded using ambient AI listening technology and understand that they can each withdraw their consent to this listening technology at any point by asking the clinician to turn off or pause the recording:    Patient name: Clotilde Pritchard      History of Present Illness  Allegra Pritchard is a 44 year old female who presents to follow up on her right knee.    Regarding her knee, she notes improvement since her last visit, attributing some relief to a steroid injection she received.  Overall, she has noticed significant improvement in her pain and ability to perform her activities of daily living since her last office visit a month ago.  As of today, she reports not being in any pain and is doing very well.  Bending the knee has become easier, and she was able to walk more than usual during a recent trip to [facility], although she experienced some discomfort by the end of the day. She has been using Enbrel for about one and a half months now prescribed by her rheumatologist, which she reports has been going well and she is not experiencing any adverse effects from this.  She denies any feelings of weakness or instability, or numbness or tingling sensation that travels down the leg or into the toes.    Of note, she does report that about 3-1/2 weeks ago she was helping her brother with his deck when she accidentally stepped into an uneven portion and rolled her ankle.  Initially, she was in a lot of pain but this has significantly improved over the past several weeks.  The swelling and bruising have significantly subsided.  She did not seek specific treatment for the ankle as the pain has been getting a lot better over the past few weeks.  She is able to walk on it without pain.  Certain twisting motions of the ankle aggravate it some.  She initially wore an over-the-counter ankle brace after the  injury which seemed to help a lot.  She has been icing elevating the knee and ankle diligently which has also helped.    She does not have any concerns or complaints at today's visit and is here for follow-up.        Physical Exam  Constitutional: Awake, alert, no distress.  Very pleasant and conversational  Psychological: Appropriate affect.  Respiratory: Unlabored breathing.  Right lower extremity:  Inspection: skin is intact without any redness or deformity.  Trace effusion.  Palpation: No tenderness to palpation over the medial or lateral joint line.  No tenderness to palpation over the MCL or LCL.  No tenderness to palpation over the superior or inferior pole of the patella.  No tenderness palpation over the posterior knee.  No tenderness to palpation of the distal quad tendon or patellar tendon.  No appreciable quad atrophy.  Range of motion: Knee can extend to 0 and flex to approximately 130 degrees.  No pain with active knee flexion or extension.  Negative Marilee test.  No calf pain or tenderness.  Negative Homans' sign.  Neuromuscular: Strength is normal and sensation is intact.  Vascular: Extremities are warm and well-perfused.  Lymph: Unremarkable.    Right ankle exam: Exam of the right foot and lower extremity reveals overlying skin is intact.  There is mild swelling about the lateral aspect of the foot.  No appreciable ecchymosis or erythema.  There is no tenderness to palpation over the medial or lateral malleolus.  No tenderness to palpation over the fibular head or neck.  No tenderness palpation over the mid or proximal tibia.  No tenderness to palpation over the ATFL.  No tenderness to palpation over the midfoot.  No calcaneal tenderness.  No Achilles tenderness.  Mild pain with active dorsiflexion and plantarflexion.  No pain with ankle inversion or eversion.  No calf pain or tenderness.  Negative Homans' sign.  Sensation is present to light touch.  She ambulates with a nonantalgic gait and good  kyree without pain.    Results        Assessment & Plan  Assessment:  44 year old female with a history of psoriatic arthritis with improving patellofemoral syndrome and partial tear of the popliteus of the right knee      Plan: Overall, the patient is about 9 weeks out since the date of her injury and has noted significant improvement in her pain and functioning since her last office visit a month ago.  She reports doing much better and not in any pain today.  It is reassuring that she is clinically and functionally doing well and physical exam today is relatively unremarkable, especially noting increased ability to actively flex the knee from 110 degrees a month ago at the time of injury to now 130 degrees of flexion today without pain, which is the patient's baseline.  As such, recommend we can continue with conservative management including a combination of activity modification, as needed use of anti-inflammatory medications, icing, elevation, bracing, physical therapy, and potential repeat steroid injections in the future.  She admits that she was not able to start physical therapy for the knee since she had an acute injury to her ankle and wanted to focus on giving that some time to heal first.  As her right ankle has significantly improved over the past few weeks, she feels she is able to participate in formal physical therapy and get the most out of it now.  Emphasized the importance of this as the recommended treatment for improving patellofemoral syndrome and partial tears of the popliteus tendon and encouraged her to start PT to work on further stretching, strengthening, and conditioning of the knee and ankle.  She is in agreement with this and will call today to schedule physical therapy.  She reports the right ankle pain is significantly better with at home treatment and rest, but explained that if her symptoms persist or worsen, would recommend scheduling a follow-up appointment with me for further  evaluation of this.  She will follow-up with me in 4 weeks or sooner with any worsening symptoms or concerns.  All questions and concerns were addressed and answered the patient satisfaction.  They are in agreement with the treatment plan going forward.        DARCIE Cárdenas, PA-C  Orthopedic Surgery   [address] [place] [id number]   t: [phone]  f: [phone]         This document was partially prepared using Dragon Medical voice recognition software.  Although every attempt is made to correct errors during dictation, discrepancies may still exist. Please contact me with any questions or clarifications.

## 2025-07-30 ENCOUNTER — WALK IN (OUTPATIENT)
Dept: URGENT CARE | Age: 45
End: 2025-07-30

## 2025-07-30 VITALS
DIASTOLIC BLOOD PRESSURE: 80 MMHG | RESPIRATION RATE: 16 BRPM | SYSTOLIC BLOOD PRESSURE: 122 MMHG | HEART RATE: 80 BPM | OXYGEN SATURATION: 96 % | TEMPERATURE: 97.2 F

## 2025-07-30 DIAGNOSIS — L03.119 CELLULITIS OF KNEE: Primary | ICD-10-CM

## 2025-07-30 PROCEDURE — 99214 OFFICE O/P EST MOD 30 MIN: CPT | Performed by: FAMILY MEDICINE

## 2025-07-30 RX ORDER — DOXYCYCLINE HYCLATE 100 MG
100 TABLET ORAL 2 TIMES DAILY
Qty: 20 TABLET | Refills: 0 | Status: SHIPPED | OUTPATIENT
Start: 2025-07-30 | End: 2025-08-09

## 2025-07-30 RX ORDER — SERDEXMETHYLPHENIDATE AND DEXMETHYLPHENIDATE 10.4; 52.3 MG/1; MG/1
CAPSULE ORAL
COMMUNITY
Start: 2025-06-30

## 2025-07-30 RX ORDER — MUPIROCIN 2 %
OINTMENT (GRAM) TOPICAL 3 TIMES DAILY
Qty: 22 G | Refills: 1 | Status: SHIPPED | OUTPATIENT
Start: 2025-07-30

## 2025-07-30 ASSESSMENT — PAIN SCALES - GENERAL: PAINLEVEL_OUTOF10: 6
